# Patient Record
(demographics unavailable — no encounter records)

---

## 2025-07-11 NOTE — HISTORY OF PRESENT ILLNESS
[FreeTextEntry1] : Annual physical  [de-identified] : 59 year old female presents for an annual physical. She currently feels well today.   Has h/o Hyperlipidemia- has been trying to focus on her diet  has had cardiac CT in October 2023- Agatston score 0 had been advised to go on a statin- patient declined  seen by cardiology recently- reports having normal stress testing  has h/o bilateral hearing loss, h/o tinnitus  work up included MRI of her brain showed nonspecific white matter to her brain has seen neurology and ENT  wearing hearing aids   has h/o Lyme disease- received treatment with Doxycycline  had recent bee sting to her left forearm and bug bite to her left ankle- which led her to urgent care was given steroids to help with the reaction from the bee sting she took Doxycycline for a day and then stopped as her IgM levels for Lyme came back negative (had blood work done at urgent care)

## 2025-07-11 NOTE — PHYSICAL EXAM
[No Acute Distress] : no acute distress [Well Nourished] : well nourished [Well Developed] : well developed [Well-Appearing] : well-appearing [Normal Sclera/Conjunctiva] : normal sclera/conjunctiva [PERRL] : pupils equal round and reactive to light [Normal Oropharynx] : the oropharynx was normal [Normal TMs] : both tympanic membranes were normal [Normal Appearance] : was normal in appearance [Neck Supple] : was supple [Normal] : the thyroid was normal [No Respiratory Distress] : no respiratory distress  [Clear to Auscultation] : lungs were clear to auscultation bilaterally [Normal Rate] : normal rate  [Regular Rhythm] : with a regular rhythm [Normal S1, S2] : normal S1 and S2 [No Murmur] : no murmur heard [No Carotid Bruits] : no carotid bruits [No Edema] : there was no peripheral edema [Soft] : abdomen soft [Non Tender] : non-tender [Normal Bowel Sounds] : normal bowel sounds [Normal Posterior Cervical Nodes] : no posterior cervical lymphadenopathy [Normal Anterior Cervical Nodes] : no anterior cervical lymphadenopathy [Grossly Normal Strength/Tone] : grossly normal strength/tone [No Rash] : no rash [No Focal Deficits] : no focal deficits [Alert and Oriented x3] : oriented to person, place, and time [Normal Insight/Judgement] : insight and judgment were intact [de-identified] : wearing hearing aids

## 2025-07-11 NOTE — HEALTH RISK ASSESSMENT
[Yes] : Yes [2 - 3 times a week (3 pts)] : 2 - 3  times a week (3 points) [1 or 2 (0 pts)] : 1 or 2 (0 points) [Never (0 pts)] : Never (0 points) [Never] : Never [Patient reported mammogram was normal] : Patient reported mammogram was normal [Patient reported PAP Smear was normal] : Patient reported PAP Smear was normal [Patient reported bone density results were normal] : Patient reported bone density results were normal [Patient reported colonoscopy was normal] : Patient reported colonoscopy was normal [de-identified] : Red wine  [Audit-CScore] : 3 [de-identified] : Yoga [de-identified] : Vegetarian- has reduced dairy/cheese intake, does eat high fat diet (avocados, peanut butter)  [MammogramDate] : 10/2023 [PapSmearDate] : 10/2024 [BoneDensityDate] : 09/2022 [ColonoscopyDate] : 12/2024 [ColonoscopyComments] : small internal hemorrhoids

## 2025-07-11 NOTE — ASSESSMENT
[Vaccines Reviewed] : Immunizations reviewed today. Please see immunization details in the vaccine log within the immunization flowsheet.  [FreeTextEntry1] : Health care maintenance: check blood work, blood drawn in office follow up with optometry/ophthalmology and dentist for routine exams when due up to date with GYN declines mammogram at this time- will reconsider and discuss with GYN up to date with colonoscopy  recommend Shingles vaccination series   Hyperlipidemia: s/p cardiology evaluation declines statin/medication at this time  c/w diet and exercise as tolerated check blood work  Overweight: c/w diet and exercise as tolerated, recommend she incorporate cardio in her exercise regimen will refer patient to a nutritionist  consider GLP-1 Agonist, patient declines at this time   Thyroid nodules: referred for updated thyroid ultrasound   History of Lyme disease, recent bug bite: referred for updated blood work to assess tick panel (can be done in 2-3 weeks)